# Patient Record
Sex: FEMALE | Race: WHITE | NOT HISPANIC OR LATINO | URBAN - METROPOLITAN AREA
[De-identification: names, ages, dates, MRNs, and addresses within clinical notes are randomized per-mention and may not be internally consistent; named-entity substitution may affect disease eponyms.]

---

## 2018-06-21 ENCOUNTER — EMERGENCY (EMERGENCY)
Facility: HOSPITAL | Age: 83
LOS: 1 days | Discharge: HOME | End: 2018-06-21
Attending: EMERGENCY MEDICINE | Admitting: EMERGENCY MEDICINE
Payer: MEDICARE

## 2018-06-21 VITALS
RESPIRATION RATE: 18 BRPM | TEMPERATURE: 98 F | SYSTOLIC BLOOD PRESSURE: 161 MMHG | OXYGEN SATURATION: 96 % | DIASTOLIC BLOOD PRESSURE: 80 MMHG | WEIGHT: 110.01 LBS | HEART RATE: 81 BPM
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
## 2018-06-21 DIAGNOSIS — Z95.1 PRESENCE OF AORTOCORONARY BYPASS GRAFT: ICD-10-CM

## 2018-06-21 DIAGNOSIS — Z79.01 LONG TERM (CURRENT) USE OF ANTICOAGULANTS: ICD-10-CM

## 2018-06-21 DIAGNOSIS — S00.83XA CONTUSION OF OTHER PART OF HEAD, INITIAL ENCOUNTER: ICD-10-CM

## 2018-06-21 DIAGNOSIS — Z23 ENCOUNTER FOR IMMUNIZATION: ICD-10-CM

## 2018-06-21 DIAGNOSIS — Z98.42 CATARACT EXTRACTION STATUS, LEFT EYE: Chronic | ICD-10-CM

## 2018-06-21 DIAGNOSIS — S06.9X0A UNSPECIFIED INTRACRANIAL INJURY WITHOUT LOSS OF CONSCIOUSNESS, INITIAL ENCOUNTER: ICD-10-CM
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
## 2018-06-21 DIAGNOSIS — Z91.041 RADIOGRAPHIC DYE ALLERGY STATUS: ICD-10-CM
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
## 2018-06-21 DIAGNOSIS — S00.81XA ABRASION OF OTHER PART OF HEAD, INITIAL ENCOUNTER: ICD-10-CM

## 2018-06-21 DIAGNOSIS — Z95.0 PRESENCE OF CARDIAC PACEMAKER: Chronic | ICD-10-CM

## 2018-06-21 PROCEDURE — 99284 EMERGENCY DEPT VISIT MOD MDM: CPT

## 2018-06-21 RX ORDER — APIXABAN 2.5 MG/1
1 TABLET, FILM COATED ORAL
Qty: 0 | Refills: 0 | COMMUNITY

## 2018-06-21 NOTE — ED ADULT NURSE NOTE - OBJECTIVE STATEMENT
trying to catch  from falling therefore she fell hitting her head, denies LOC, nausea, vomiting. LOUIS trying to catch  from falling therefore she fell hitting her head, denies LOC or visual disturbances, nausea, vomiting. LOUIS

## 2018-06-21 NOTE — ED ADULT NURSE NOTE - CHPI ED SYMPTOMS NEG
no fever/no bleeding/no abrasion/no loss of consciousness/no confusion/no deformity/no vomiting/no weakness

## 2018-06-21 NOTE — ED ADULT TRIAGE NOTE - CHIEF COMPLAINT QUOTE
s/p fall this 530 pm, hit her head on concrete, c/o lac to left eyebrow, no LOC, denies CP, hip pain, on Eliquis for AFIB, no active bleeding

## 2018-06-22 VITALS
DIASTOLIC BLOOD PRESSURE: 74 MMHG | HEART RATE: 78 BPM | SYSTOLIC BLOOD PRESSURE: 157 MMHG | RESPIRATION RATE: 18 BRPM | OXYGEN SATURATION: 99 %

## 2018-06-22 PROCEDURE — 70450 CT HEAD/BRAIN W/O DYE: CPT

## 2018-06-22 PROCEDURE — 70450 CT HEAD/BRAIN W/O DYE: CPT | Mod: 26

## 2018-06-22 PROCEDURE — 99284 EMERGENCY DEPT VISIT MOD MDM: CPT | Mod: 25

## 2018-06-22 PROCEDURE — 90715 TDAP VACCINE 7 YRS/> IM: CPT

## 2018-06-22 PROCEDURE — 90471 IMMUNIZATION ADMIN: CPT

## 2018-06-22 RX ORDER — BACITRACIN ZINC 500 UNIT/G
1 OINTMENT IN PACKET (EA) TOPICAL ONCE
Qty: 0 | Refills: 0 | Status: COMPLETED | OUTPATIENT
Start: 2018-06-22 | End: 2018-06-22

## 2018-06-22 RX ORDER — TETANUS TOXOID, REDUCED DIPHTHERIA TOXOID AND ACELLULAR PERTUSSIS VACCINE, ADSORBED 5; 2.5; 8; 8; 2.5 [IU]/.5ML; [IU]/.5ML; UG/.5ML; UG/.5ML; UG/.5ML
0.5 SUSPENSION INTRAMUSCULAR ONCE
Qty: 0 | Refills: 0 | Status: COMPLETED | OUTPATIENT
Start: 2018-06-22 | End: 2018-06-22

## 2018-06-22 RX ADMIN — Medication 1 APPLICATION(S): at 01:03

## 2018-06-22 RX ADMIN — TETANUS TOXOID, REDUCED DIPHTHERIA TOXOID AND ACELLULAR PERTUSSIS VACCINE, ADSORBED 0.5 MILLILITER(S): 5; 2.5; 8; 8; 2.5 SUSPENSION INTRAMUSCULAR at 00:59

## 2018-06-22 NOTE — ED PROVIDER NOTE - CARE PLAN
Principal Discharge DX:	Fall  Secondary Diagnosis:	Abrasion  Secondary Diagnosis:	Closed head injury

## 2018-06-22 NOTE — ED PROVIDER NOTE - PHYSICAL EXAMINATION
superficial skin abrasion and ecchymosis left lateral forehead region otherwise no overt defects + FROM extremities A&O x 3 no deficits or limitations noted

## 2018-06-22 NOTE — ED PROVIDER NOTE - OBJECTIVE STATEMENT
accidental fall tonight as  fell onto her Denies LOC + skin injury left side forehead and contusion denies other injury

## 2018-06-22 NOTE — ED PROVIDER NOTE - ATTENDING CONTRIBUTION TO CARE
88 yof pw fall, no LOC, w/ L forehead skin abrasion.     agree w/ PA, nad, well appearing, will CT head, tdap

## 2020-05-22 DIAGNOSIS — Z45.018 ENCOUNTER FOR ADJUSTMENT AND MANAGEMENT OF OTHER PART OF CARDIAC PACEMAKER: ICD-10-CM

## 2020-05-26 PROBLEM — Z98.42 CATARACT EXTRACTION STATUS, LEFT EYE: Chronic | Status: ACTIVE | Noted: 2018-06-21

## 2020-05-26 PROBLEM — I48.91 UNSPECIFIED ATRIAL FIBRILLATION: Chronic | Status: ACTIVE | Noted: 2018-06-21

## 2020-05-27 ENCOUNTER — OUTPATIENT (OUTPATIENT)
Dept: OUTPATIENT SERVICES | Facility: HOSPITAL | Age: 85
LOS: 1 days | Discharge: HOME | End: 2020-05-27
Payer: MEDICARE

## 2020-05-27 DIAGNOSIS — Z95.0 PRESENCE OF CARDIAC PACEMAKER: Chronic | ICD-10-CM

## 2020-05-27 DIAGNOSIS — Z98.42 CATARACT EXTRACTION STATUS, LEFT EYE: Chronic | ICD-10-CM

## 2020-05-27 PROCEDURE — 33208 INSRT HEART PM ATRIAL & VENT: CPT | Mod: KX

## 2020-05-27 PROCEDURE — C1889: CPT

## 2020-05-27 PROCEDURE — C1785: CPT

## 2020-05-27 PROCEDURE — 33208 INSRT HEART PM ATRIAL & VENT: CPT

## 2020-05-27 RX ORDER — CEFAZOLIN SODIUM 1 G
1000 VIAL (EA) INJECTION ONCE
Refills: 0 | Status: COMPLETED | OUTPATIENT
Start: 2020-05-27 | End: 2020-05-27

## 2020-05-27 RX ADMIN — Medication 100 MILLIGRAM(S): at 14:08

## 2020-05-27 NOTE — PROGRESS NOTE ADULT - SUBJECTIVE AND OBJECTIVE BOX
EPS Progress Note    S: 89 yo F with history of HTN, DM , mild dementia, atrial fibrillation, MDT PPM, presented for PPM generator change due to PPM at EOL.   Patient has neg COVID test, denies any chest pain, SOB, palpitations, dizziness, syncope. She has been of her ELiquis for the past 2 days.      MEDICATIONS  (STANDING):  ceFAZolin   IVPB 1000 milliGRAM(s) IV Intermittent Once  Eliquis  Lipitor  Toprol  Norvasc  Prilosec          General:  NAD        HEENT:  PERRL, EOMI	  Neck: Supple, - JVD  Cardiovascular: S1 S2,   Respiratory: CTA B/L       Gastrointestinal:  Soft, Non-tender, + BS	  Skin: No rashes, No ecchymoses, No cyanosis  Extremities: No edema  Neurologic: Non-focal         Labs:                                         Assessment/Plan:    89 yo F with history of HTN, DM , mild dementia, atrial fibrillation, MDT PPM, presented for PPM generator change due to PPM at EOL.   Will plan for discharge home today if stable.

## 2020-05-27 NOTE — PROGRESS NOTE ADULT - SUBJECTIVE AND OBJECTIVE BOX
JANETT LEROY  [id number]    PROCEDURE:  Generator change        INDICATION:  MIKE        ELECTROPHYSIOLOGIST(S):  MD Yazmin Abarca MD        FINDINGS:  - Successful generator change      COMPLICATIONS:  None      RECOMMENDATIONS:  - Ok to go home later today  - Follow up as outpatient for wound check

## 2020-06-25 ENCOUNTER — APPOINTMENT (OUTPATIENT)
Dept: HEART AND VASCULAR | Facility: CLINIC | Age: 85
End: 2020-06-25
Payer: MEDICARE

## 2020-06-25 PROCEDURE — 99441: CPT

## 2021-04-22 ENCOUNTER — APPOINTMENT (OUTPATIENT)
Dept: HEART AND VASCULAR | Facility: CLINIC | Age: 86
End: 2021-04-22
Payer: MEDICARE

## 2021-04-22 VITALS
HEART RATE: 70 BPM | DIASTOLIC BLOOD PRESSURE: 55 MMHG | BODY MASS INDEX: 17.89 KG/M2 | HEIGHT: 67 IN | SYSTOLIC BLOOD PRESSURE: 109 MMHG | WEIGHT: 114 LBS

## 2021-04-22 PROCEDURE — 93280 PM DEVICE PROGR EVAL DUAL: CPT

## 2021-04-22 PROCEDURE — 99072 ADDL SUPL MATRL&STAF TM PHE: CPT

## 2021-04-28 NOTE — DISCUSSION/SUMMARY
[Pacemaker Function Normal] : normal pacemaker function [FreeTextEntry1] : 89 year old female with HTN, diabetes, atrial fibrillation s/p pacemaker and recent generator change 3/2020, who presents for follow up.  Device incision is well healed.  Interrogation reveals normal function and all measured data is within normal limits.  She is in persistent asymptomatic rate controlled atrial fibrillation and is on oral anticoagulation.  She will follow up in 6 months or sooner if needed and knows to call with any questions or concerns.

## 2021-04-28 NOTE — PHYSICAL EXAM
[General Appearance - Well Developed] : well developed [Normal Appearance] : normal appearance [Well Groomed] : well groomed [General Appearance - Well Nourished] : well nourished [No Deformities] : no deformities [General Appearance - In No Acute Distress] : no acute distress [Heart Rate And Rhythm] : heart rate and rhythm were normal [Heart Sounds] : normal S1 and S2 [Respiration, Rhythm And Depth] : normal respiratory rhythm and effort [Exaggerated Use Of Accessory Muscles For Inspiration] : no accessory muscle use [Clean] : clean [Dry] : dry [Well-Healed] : well-healed [] : no ischemic changes [Palpable Crepitus] : no palpable crepitus [Bleeding] : no active bleeding [Foul Odor] : no foul smell [Purulent Drainage] : no purulent drainage [Serosanguineous Drainage] : no serosanquineous drainage [Serous Drainage] : no serous drainage [Erythema] : not erythematous [Warm] : not warm [Tender] : not tender [Indurated] : not indurated [Fluctuant] : not fluctuant

## 2021-04-28 NOTE — PROCEDURE
[Atrial Fibrillation] : atrial fibrillation [Pacemaker] : pacemaker [Threshold Testing Performed] : Threshold testing was performed [Lead Imp:  ___ohms] : lead impedance was [unfilled] ohms [Sensing Amplitude ___mv] : sensing amplitude was [unfilled] mv [___V @] : [unfilled] V [___ ms] : [unfilled] ms [de-identified] : Medtronic [de-identified] : casey [de-identified] : [id number] [de-identified] : 5.2020 [de-identified] : AAIR<-->DDDR [de-identified] :  [de-identified] : 12 years  [de-identified] : now in persistent afib\par AP 11%\par  66%

## 2021-04-28 NOTE — HISTORY OF PRESENT ILLNESS
[de-identified] : 89 year old female with HTN, diabetes, atrial fibrillation s/p pacemaker and recent generator change 3/2020, who presents for follow up.\par \par She presents for routine follow up and has no complaints.  No chest pain, SOB, syncope, near syncope or palpitations.  She is on Eliquis.  No device related issues.

## 2021-04-28 NOTE — ADDENDUM
[FreeTextEntry1] : I, Sean Abarca, hereby attest that the medical record entry for this patient accurately reflects signatures/notations that I made on the Date of Service in my capacity as an Attending Physician when I treated/diagnosed the above patient. I do hereby attest that this information is true, accurate and complete to the best of my knowledge and I understand that any falsification, omission, or concealment of material fact may subject me to administrative, civil, or, criminal liability. \par I was present for the entire visit and supervised the entire visit and agree with the plan as outlined.\par

## 2021-09-15 NOTE — ED PROVIDER NOTE - CPE EDP ENMT NORM
Reason for Disposition  • Blood in or on bowel movement is the main symptom  • [1] Large amount of blood AND (2) adult stable    Protocols used: RECTAL SYMPTOMS-A-AH, RECTAL BLEEDING-A-AH     normal...

## 2021-11-05 ENCOUNTER — APPOINTMENT (OUTPATIENT)
Dept: VASCULAR SURGERY | Facility: CLINIC | Age: 86
End: 2021-11-05

## 2022-03-03 ENCOUNTER — APPOINTMENT (OUTPATIENT)
Dept: HEART AND VASCULAR | Facility: CLINIC | Age: 87
End: 2022-03-03
Payer: MEDICARE

## 2022-03-03 VITALS
HEIGHT: 67 IN | HEART RATE: 61 BPM | BODY MASS INDEX: 18.21 KG/M2 | SYSTOLIC BLOOD PRESSURE: 120 MMHG | WEIGHT: 116 LBS | DIASTOLIC BLOOD PRESSURE: 80 MMHG | TEMPERATURE: 98.1 F

## 2022-03-03 PROCEDURE — 93280 PM DEVICE PROGR EVAL DUAL: CPT

## 2022-03-03 RX ORDER — APIXABAN 2.5 MG/1
2.5 TABLET, FILM COATED ORAL
Refills: 0 | Status: ACTIVE | COMMUNITY
Start: 2022-03-03

## 2022-03-09 NOTE — REVIEW OF SYSTEMS
[Negative] : Heme/Lymph [Fever] : no fever [Chills] : no chills [SOB] : no shortness of breath [Syncope] : no syncope

## 2022-03-09 NOTE — HISTORY OF PRESENT ILLNESS
[de-identified] : 89 year old female with HTN, diabetes, atrial fibrillation s/p pacemaker and generator change 3/2020, who presents for follow up.\par \par She presents for routine follow up and has no complaints.  No chest pain, SOB, syncope, near syncope or palpitations.  She is on Eliquis.  No device related complaints.

## 2022-03-09 NOTE — DISCUSSION/SUMMARY
[Pacemaker Function Normal] : normal pacemaker function [FreeTextEntry1] : 89 year old female with HTN, diabetes, atrial fibrillation s/p pacemaker and recent generator change 3/2020, who presents for follow up.  Device interrogation reveals normal function and all measured data is within normal limits.  She is in and out of asymptomatic rate controlled atrial fibrillation and is on oral anticoagulation. No changes made today.  She will follow up in 6 months or sooner if needed and knows to call with any questions or concerns.

## 2022-03-09 NOTE — PHYSICAL EXAM
[General Appearance - Well Developed] : well developed [Normal Appearance] : normal appearance [Well Groomed] : well groomed [General Appearance - Well Nourished] : well nourished [No Deformities] : no deformities [General Appearance - In No Acute Distress] : no acute distress [Heart Rate And Rhythm] : heart rate and rhythm were normal [Heart Sounds] : normal S1 and S2 [Respiration, Rhythm And Depth] : normal respiratory rhythm and effort [Exaggerated Use Of Accessory Muscles For Inspiration] : no accessory muscle use [Clean] : clean [Dry] : dry [Well-Healed] : well-healed [] : no ischemic changes [Auscultation Breath Sounds / Voice Sounds] : lungs were clear to auscultation bilaterally [Bleeding] : no active bleeding [Palpable Crepitus] : no palpable crepitus [Foul Odor] : no foul smell [Purulent Drainage] : no purulent drainage [Serosanguineous Drainage] : no serosanquineous drainage [Serous Drainage] : no serous drainage [Erythema] : not erythematous [Warm] : not warm [Tender] : not tender [Indurated] : not indurated [Fluctuant] : not fluctuant

## 2022-03-09 NOTE — PROCEDURE
[Atrial Fibrillation] : atrial fibrillation [Pacemaker] : pacemaker [Threshold Testing Performed] : Threshold testing was performed [Lead Imp:  ___ohms] : lead impedance was [unfilled] ohms [Sensing Amplitude ___mv] : sensing amplitude was [unfilled] mv [___V @] : [unfilled] V [___ ms] : [unfilled] ms [de-identified] : Medtronic [de-identified] : casey [de-identified] : [id number] [de-identified] : 5.2020 [de-identified] :  [de-identified] : AAIR<-->DDDR [de-identified] : 10.9 years  [de-identified] : 56% afib\par AP 44%\par  54%

## 2022-07-28 ENCOUNTER — APPOINTMENT (OUTPATIENT)
Dept: HEART AND VASCULAR | Facility: CLINIC | Age: 87
End: 2022-07-28

## 2022-07-28 VITALS
WEIGHT: 127 LBS | HEART RATE: 80 BPM | DIASTOLIC BLOOD PRESSURE: 67 MMHG | HEIGHT: 67 IN | BODY MASS INDEX: 19.93 KG/M2 | SYSTOLIC BLOOD PRESSURE: 142 MMHG

## 2022-07-28 PROCEDURE — 93280 PM DEVICE PROGR EVAL DUAL: CPT

## 2022-08-04 NOTE — DISCUSSION/SUMMARY
[Pacemaker Function Normal] : normal pacemaker function [FreeTextEntry1] : 89 year old female with HTN, diabetes, atrial fibrillation s/p pacemaker and recent generator change 3/2020, who presents for follow up.  Device interrogation reveals normal function and all measured data is within normal limits.  She is in and out of asymptomatic rate controlled atrial fibrillation and is on Eliquis. No changes made today.  She will follow up in 6 months or sooner if needed and knows to call with any questions or concerns.

## 2022-08-04 NOTE — PHYSICAL EXAM
[General Appearance - Well Developed] : well developed [Normal Appearance] : normal appearance [Well Groomed] : well groomed [General Appearance - Well Nourished] : well nourished [No Deformities] : no deformities [General Appearance - In No Acute Distress] : no acute distress [Heart Rate And Rhythm] : heart rate and rhythm were normal [Heart Sounds] : normal S1 and S2 [] : no respiratory distress [Respiration, Rhythm And Depth] : normal respiratory rhythm and effort [Exaggerated Use Of Accessory Muscles For Inspiration] : no accessory muscle use [Clean] : clean [Dry] : dry [Well-Healed] : well-healed [Auscultation Breath Sounds / Voice Sounds] : lungs were clear to auscultation bilaterally [Palpable Crepitus] : no palpable crepitus [Bleeding] : no active bleeding [Foul Odor] : no foul smell [Purulent Drainage] : no purulent drainage [Serous Drainage] : no serous drainage [Erythema] : not erythematous [Warm] : not warm [Tender] : not tender [Indurated] : not indurated [Fluctuant] : not fluctuant

## 2022-08-04 NOTE — HISTORY OF PRESENT ILLNESS
[de-identified] : 89 year old female with HTN, diabetes, atrial fibrillation s/p pacemaker and generator change 3/2020, who presents for follow up.\par \par She presents for routine follow up and has no complaints.  No chest pain, SOB, syncope, near syncope or palpitations.  Chronic venous stasis.  She is on Eliquis.  No device related issues

## 2022-08-04 NOTE — PROCEDURE
[Atrial Fibrillation] : atrial fibrillation [Pacemaker] : pacemaker [Threshold Testing Performed] : Threshold testing was performed [Lead Imp:  ___ohms] : lead impedance was [unfilled] ohms [Sensing Amplitude ___mv] : sensing amplitude was [unfilled] mv [___V @] : [unfilled] V [___ ms] : [unfilled] ms [None] : none [de-identified] : Medtronic [de-identified] : casey [de-identified] : [id number] [de-identified] : 5.2020 [de-identified] : AAIR<-->DDDR [de-identified] :  [de-identified] : 10.3 years  [de-identified] : 54% afib\par AP 46%\par  54%

## 2022-08-04 NOTE — REVIEW OF SYSTEMS
[Negative] : Heme/Lymph [Fever] : no fever [Weight Gain (___ Lbs)] : no recent weight gain [Chills] : no chills [Feeling Fatigued] : not feeling fatigued [Weight Loss (___ Lbs)] : no recent weight loss [SOB] : no shortness of breath [Chest Discomfort] : no chest discomfort [Syncope] : no syncope

## 2023-01-26 ENCOUNTER — APPOINTMENT (OUTPATIENT)
Dept: HEART AND VASCULAR | Facility: CLINIC | Age: 88
End: 2023-01-26

## 2023-05-11 ENCOUNTER — APPOINTMENT (OUTPATIENT)
Dept: HEART AND VASCULAR | Facility: CLINIC | Age: 88
End: 2023-05-11
Payer: MEDICARE

## 2023-05-11 VITALS — SYSTOLIC BLOOD PRESSURE: 81 MMHG | DIASTOLIC BLOOD PRESSURE: 42 MMHG | HEIGHT: 67 IN | HEART RATE: 75 BPM

## 2023-05-11 PROCEDURE — 93280 PM DEVICE PROGR EVAL DUAL: CPT

## 2023-05-16 NOTE — ED ADULT NURSE NOTE - MUSCULOSKELETAL WDL
Medicare Annual Wellness Visit    Joyce Covarrubias is here for Medicare AWV, Hypertension, and Rash    Assessment & Plan   Anemia, unspecified type  Aneurysm of ascending aorta without rupture (HCC)  Paroxysmal atrial fibrillation (HCC)  Thrombocytopenia, unspecified  Hyperlipidemia, unspecified hyperlipidemia type  Essential hypertension    Recommendations for Preventive Services Due: see orders and patient instructions/AVS.  Recommended screening schedule for the next 5-10 years is provided to the patient in written form: see Patient Instructions/AVS.     No follow-ups on file. Subjective   The following acute and/or chronic problems were also addressed today:  Doing well  C/o rash chest    Patient's complete Health Risk Assessment and screening values have been reviewed and are found in Flowsheets. The following problems were reviewed today and where indicated follow up appointments were made and/or referrals ordered. Positive Risk Factor Screenings with Interventions:                 Weight and Activity:  Physical Activity: Insufficiently Active    Days of Exercise per Week: 3 days    Minutes of Exercise per Session: 10 min     On average, how many days per week do you engage in moderate to strenuous exercise (like a brisk walk)?: 3 days  Have you lost any weight without trying in the past 3 months?: No  Body mass index is 33.09 kg/m². (!) Abnormal  Obesity Interventions:  Patient advised to follow-up in this office for further evaluation and treatment               Advanced Directives:  Do you have a Living Will?: (!) No    Intervention:  has NO advanced directive - not interested in additional information         CV Risk Counseling:  Patient was asked about his current diet and exercise habits, and personalized advice was provided regarding recommended lifestyle changes.  Patient's individual cardiovascular disease risk factors, including advanced age (> 54 for men, > 72 for women), were discussed, as
Full range of motion of upper and lower extremities, no joint tenderness/swelling.

## 2023-05-17 NOTE — PROCEDURE
[Atrial Fibrillation] : atrial fibrillation [Pacemaker] : pacemaker [Threshold Testing Performed] : Threshold testing was performed [Lead Imp:  ___ohms] : lead impedance was [unfilled] ohms [Sensing Amplitude ___mv] : sensing amplitude was [unfilled] mv [___V @] : [unfilled] V [___ ms] : [unfilled] ms [None] : none [de-identified] : Medtronic [de-identified] : casey [de-identified] : [id number] [de-identified] : 5.2020 [de-identified] : AAIR<-->DDDR [de-identified] :  [de-identified] : 9 years  [de-identified] : 70% afib\par AP 46%\par  50%

## 2023-05-17 NOTE — HISTORY OF PRESENT ILLNESS
[de-identified] : 89 year old female with HTN, diabetes, atrial fibrillation s/p pacemaker and generator change 3/2020, who presents for follow up.\par \par She presents for routine follow up and has no complaints.  No chest pain, SOB, syncope, near syncope or palpitations.  Chronic venous stasis.  She is on Eliquis.  No device related complaints \par

## 2023-05-17 NOTE — HISTORY OF PRESENT ILLNESS
[de-identified] : 89 year old female with HTN, diabetes, atrial fibrillation s/p pacemaker and generator change 3/2020, who presents for follow up.\par \par She presents for routine follow up and has no complaints.  No chest pain, SOB, syncope, near syncope or palpitations.  Chronic venous stasis.  She is on Eliquis.  No device related complaints \par

## 2023-05-17 NOTE — PROCEDURE
[Atrial Fibrillation] : atrial fibrillation [Pacemaker] : pacemaker [Threshold Testing Performed] : Threshold testing was performed [Lead Imp:  ___ohms] : lead impedance was [unfilled] ohms [Sensing Amplitude ___mv] : sensing amplitude was [unfilled] mv [___V @] : [unfilled] V [___ ms] : [unfilled] ms [None] : none [de-identified] : Medtronic [de-identified] : casey [de-identified] : [id number] [de-identified] : 5.2020 [de-identified] : AAIR<-->DDDR [de-identified] :  [de-identified] : 9 years  [de-identified] : 70% afib\par AP 46%\par  50%

## 2023-05-17 NOTE — ADDENDUM
[FreeTextEntry1] : I, Sean Abarca, hereby attest that the medical record entry for this patient accurately reflects signatures/notations that I made on the Date of Service in my capacity as an Attending Physician when I treated/diagnosed the above patient. I do hereby attest that this information is true, accurate and complete to the best of my knowledge and I understand that any falsification, omission, or concealment of material fact may subject me to administrative, civil, or, criminal liability. \par I was present for the entire visit and supervised the entire visit and agree with the plan as outlined.\par I, Abiodun Blanco, am scribing for and the presence of Dr. Abraca the following sections: HPI, PMH,Family/social history, ROS, Physical Exam, Assessment / Plan.\par

## 2023-05-17 NOTE — DISCUSSION/SUMMARY
[Pacemaker Function Normal] : normal pacemaker function [FreeTextEntry1] : 89 year old female with HTN, diabetes, atrial fibrillation s/p pacemaker and recent generator change 3/2020, who presents for follow up.  Device interrogation reveals normal function and all measured data is within normal limits.  She is in and out of asymptomatic rate controlled atrial fibrillation and is on Eliquis. No changes made today.  She will follow up in 6 months or sooner if needed and knows to call with any questions or concerns.

## 2023-05-17 NOTE — ADDENDUM
[FreeTextEntry1] : I, Sean Abarca, hereby attest that the medical record entry for this patient accurately reflects signatures/notations that I made on the Date of Service in my capacity as an Attending Physician when I treated/diagnosed the above patient. I do hereby attest that this information is true, accurate and complete to the best of my knowledge and I understand that any falsification, omission, or concealment of material fact may subject me to administrative, civil, or, criminal liability. \par I was present for the entire visit and supervised the entire visit and agree with the plan as outlined.\par I, Abiodun Blanco, am scribing for and the presence of Dr. Abarca the following sections: HPI, PMH,Family/social history, ROS, Physical Exam, Assessment / Plan.\par

## 2023-05-17 NOTE — REVIEW OF SYSTEMS
[Feeling Fatigued] : not feeling fatigued [Negative] : Heme/Lymph [Fever] : no fever [Weight Gain (___ Lbs)] : no recent weight gain [Chills] : no chills [Weight Loss (___ Lbs)] : no recent weight loss [SOB] : no shortness of breath [Chest Discomfort] : no chest discomfort [Palpitations] : no palpitations [Syncope] : no syncope

## 2023-12-06 NOTE — ED ADULT NURSE NOTE - PATIENT DISCHARGE SIGNATURE
Payal from [facility] calling to speak to a nurse about cancelling the order for patient.   22-Jun-2018

## 2024-02-08 ENCOUNTER — APPOINTMENT (OUTPATIENT)
Dept: HEART AND VASCULAR | Facility: CLINIC | Age: 89
End: 2024-02-08

## 2024-04-25 ENCOUNTER — APPOINTMENT (OUTPATIENT)
Dept: HEART AND VASCULAR | Facility: CLINIC | Age: 89
End: 2024-04-25
Payer: MEDICARE

## 2024-04-25 VITALS — DIASTOLIC BLOOD PRESSURE: 71 MMHG | HEIGHT: 67 IN | HEART RATE: 77 BPM | SYSTOLIC BLOOD PRESSURE: 113 MMHG

## 2024-04-25 DIAGNOSIS — I48.20 CHRONIC ATRIAL FIBRILLATION, UNSP: ICD-10-CM

## 2024-04-25 PROCEDURE — 93280 PM DEVICE PROGR EVAL DUAL: CPT

## 2024-04-27 NOTE — DISCUSSION/SUMMARY
[Pacemaker Function Normal] : normal pacemaker function [FreeTextEntry1] : 89 year old female with HTN, diabetes, atrial fibrillation s/p pacemaker and recent generator change 3/2020, who presents for follow up.  Device interrogation reveals normal function, and all measured data is within normal limits.  She is in rate controlled atrial fibrillation and is on Eliquis. No changes made today.  She has follow up with her general cardiologist.  She will follow up in 6 months or sooner if needed and knows to call with any questions or concerns.

## 2024-04-27 NOTE — PROCEDURE
[Atrial Fibrillation] : atrial fibrillation [Pacemaker] : pacemaker [Threshold Testing Performed] : Threshold testing was performed [Lead Imp:  ___ohms] : lead impedance was [unfilled] ohms [Sensing Amplitude ___mv] : sensing amplitude was [unfilled] mv [___V @] : [unfilled] V [___ ms] : [unfilled] ms [None] : none [de-identified] : Medtronic [de-identified] : casey [de-identified] : [id number] [de-identified] : 5.2020 [de-identified] : AAIR<-->DDDR [de-identified] :  [de-identified] : 9 years  [de-identified] : 99% afib AP 4.5%  98%

## 2024-04-27 NOTE — REVIEW OF SYSTEMS
[Negative] : Heme/Lymph [Chest Discomfort] : no chest discomfort [Fever] : no fever [Weight Gain (___ Lbs)] : no recent weight gain [Chills] : no chills [Weight Loss (___ Lbs)] : no recent weight loss [SOB] : no shortness of breath [Palpitations] : no palpitations [Orthopnea] : no orthopnea [Syncope] : no syncope [Cough] : no cough [Wheezing] : no wheezing

## 2024-04-27 NOTE — PHYSICAL EXAM
[General Appearance - Well Developed] : well developed [Normal Appearance] : normal appearance [Well Groomed] : well groomed [General Appearance - Well Nourished] : well nourished [No Deformities] : no deformities [General Appearance - In No Acute Distress] : no acute distress [Heart Rate And Rhythm] : heart rate and rhythm were normal [Heart Sounds] : normal S1 and S2 [] : no respiratory distress [Respiration, Rhythm And Depth] : normal respiratory rhythm and effort [Exaggerated Use Of Accessory Muscles For Inspiration] : no accessory muscle use [Auscultation Breath Sounds / Voice Sounds] : lungs were clear to auscultation bilaterally [Clean] : clean [Dry] : dry [Well-Healed] : well-healed [Serosanguineous Drainage] : no serosanquineous drainage [Palpable Crepitus] : no palpable crepitus [Bleeding] : no active bleeding [Foul Odor] : no foul smell [Purulent Drainage] : no purulent drainage [Serous Drainage] : no serous drainage [Erythema] : not erythematous [Warm] : not warm [Tender] : not tender [Indurated] : not indurated [Fluctuant] : not fluctuant

## 2024-04-27 NOTE — HISTORY OF PRESENT ILLNESS
[de-identified] : 89 year old female with HTN, diabetes, atrial fibrillation s/p pacemaker and generator change 3/2020, who presents for follow up.  She presents for routine follow up and has no complaints.  No SOB, syncope, near syncope or palpitations.  Chronic venous stasis.  Sometimes she feels a "pinching chest pain".  She has followed with her cardiologist.   She is on Eliquis.  No device related complaints

## 2024-07-02 ENCOUNTER — EMERGENCY (EMERGENCY)
Facility: HOSPITAL | Age: 89
LOS: 1 days | Discharge: HOME | End: 2024-07-02
Attending: EMERGENCY MEDICINE | Admitting: EMERGENCY MEDICINE
Payer: MEDICARE

## 2024-07-02 VITALS
OXYGEN SATURATION: 95 % | HEART RATE: 76 BPM | SYSTOLIC BLOOD PRESSURE: 124 MMHG | RESPIRATION RATE: 18 BRPM | HEIGHT: 65 IN | WEIGHT: 123.02 LBS | DIASTOLIC BLOOD PRESSURE: 59 MMHG | TEMPERATURE: 98 F

## 2024-07-02 DIAGNOSIS — Z95.0 PRESENCE OF CARDIAC PACEMAKER: Chronic | ICD-10-CM

## 2024-07-02 DIAGNOSIS — Z98.42 CATARACT EXTRACTION STATUS, LEFT EYE: Chronic | ICD-10-CM

## 2024-07-02 DIAGNOSIS — I25.10 ATHEROSCLEROTIC HEART DISEASE OF NATIVE CORONARY ARTERY WITHOUT ANGINA PECTORIS: ICD-10-CM

## 2024-07-02 DIAGNOSIS — R50.9 FEVER, UNSPECIFIED: ICD-10-CM

## 2024-07-02 DIAGNOSIS — I48.91 UNSPECIFIED ATRIAL FIBRILLATION: ICD-10-CM

## 2024-07-02 DIAGNOSIS — U07.1 COVID-19: ICD-10-CM

## 2024-07-02 DIAGNOSIS — Z91.041 RADIOGRAPHIC DYE ALLERGY STATUS: ICD-10-CM

## 2024-07-02 DIAGNOSIS — Z79.01 LONG TERM (CURRENT) USE OF ANTICOAGULANTS: ICD-10-CM

## 2024-07-02 PROCEDURE — 99283 EMERGENCY DEPT VISIT LOW MDM: CPT

## 2024-07-02 PROCEDURE — 99284 EMERGENCY DEPT VISIT MOD MDM: CPT

## 2024-07-02 RX ORDER — DEXAMETHASONE 1 MG/1
6 TABLET ORAL ONCE
Refills: 0 | Status: COMPLETED | OUTPATIENT
Start: 2024-07-02 | End: 2024-07-02

## 2024-07-02 RX ORDER — ACETAMINOPHEN 325 MG
650 TABLET ORAL ONCE
Refills: 0 | Status: COMPLETED | OUTPATIENT
Start: 2024-07-02 | End: 2024-07-02

## 2024-07-02 RX ADMIN — DEXAMETHASONE 6 MILLIGRAM(S): 1 TABLET ORAL at 21:11

## 2024-07-02 RX ADMIN — Medication 650 MILLIGRAM(S): at 21:12

## 2024-07-03 RX ORDER — DEXAMETHASONE 1 MG/1
1 TABLET ORAL
Qty: 6 | Refills: 0
Start: 2024-07-03 | End: 2024-07-08

## 2024-10-23 ENCOUNTER — APPOINTMENT (OUTPATIENT)
Dept: HEART AND VASCULAR | Facility: CLINIC | Age: 89
End: 2024-10-23

## 2024-10-23 VITALS
HEIGHT: 67 IN | DIASTOLIC BLOOD PRESSURE: 53 MMHG | TEMPERATURE: 98.3 F | WEIGHT: 126 LBS | SYSTOLIC BLOOD PRESSURE: 107 MMHG | OXYGEN SATURATION: 96 % | HEART RATE: 67 BPM | BODY MASS INDEX: 19.78 KG/M2

## 2024-10-23 DIAGNOSIS — I48.20 CHRONIC ATRIAL FIBRILLATION, UNSP: ICD-10-CM

## 2024-10-23 PROCEDURE — G2211 COMPLEX E/M VISIT ADD ON: CPT

## 2024-10-23 PROCEDURE — 99212 OFFICE O/P EST SF 10 MIN: CPT

## 2024-10-23 PROCEDURE — 93280 PM DEVICE PROGR EVAL DUAL: CPT

## 2024-12-04 ENCOUNTER — NON-APPOINTMENT (OUTPATIENT)
Age: 88
End: 2024-12-04

## 2024-12-04 ENCOUNTER — APPOINTMENT (OUTPATIENT)
Dept: OPHTHALMOLOGY | Facility: CLINIC | Age: 88
End: 2024-12-04
Payer: MEDICARE

## 2024-12-04 PROCEDURE — 92004 COMPRE OPH EXAM NEW PT 1/>: CPT

## 2024-12-04 PROCEDURE — 92083 EXTENDED VISUAL FIELD XM: CPT

## 2024-12-04 PROCEDURE — 92250 FUNDUS PHOTOGRAPHY W/I&R: CPT

## 2025-05-27 ENCOUNTER — APPOINTMENT (OUTPATIENT)
Dept: OPHTHALMOLOGY | Facility: CLINIC | Age: 89
End: 2025-05-27
Payer: MEDICARE

## 2025-05-27 ENCOUNTER — NON-APPOINTMENT (OUTPATIENT)
Age: 89
End: 2025-05-27

## 2025-05-27 PROCEDURE — 92201 OPSCPY EXTND RTA DRAW UNI/BI: CPT

## 2025-05-27 PROCEDURE — 92134 CPTRZ OPH DX IMG PST SGM RTA: CPT

## 2025-05-27 PROCEDURE — 92014 COMPRE OPH EXAM EST PT 1/>: CPT

## 2025-05-27 PROCEDURE — 92083 EXTENDED VISUAL FIELD XM: CPT
